# Patient Record
Sex: MALE | Race: WHITE | NOT HISPANIC OR LATINO | Employment: FULL TIME | URBAN - METROPOLITAN AREA
[De-identification: names, ages, dates, MRNs, and addresses within clinical notes are randomized per-mention and may not be internally consistent; named-entity substitution may affect disease eponyms.]

---

## 2017-06-15 ENCOUNTER — LAB VISIT (OUTPATIENT)
Dept: LAB | Facility: HOSPITAL | Age: 57
End: 2017-06-15
Attending: FAMILY MEDICINE
Payer: COMMERCIAL

## 2017-06-15 DIAGNOSIS — I10 ESSENTIAL HYPERTENSION, MALIGNANT: Primary | ICD-10-CM

## 2017-06-15 DIAGNOSIS — Z12.5 SPECIAL SCREENING FOR MALIGNANT NEOPLASM OF PROSTATE: ICD-10-CM

## 2017-06-15 LAB
ANION GAP SERPL CALC-SCNC: 7 MMOL/L
BUN SERPL-MCNC: 16 MG/DL
CALCIUM SERPL-MCNC: 9 MG/DL
CHLORIDE SERPL-SCNC: 105 MMOL/L
CHOLEST/HDLC SERPL: 4.2 {RATIO}
CO2 SERPL-SCNC: 27 MMOL/L
COMPLEXED PSA SERPL-MCNC: 2 NG/ML
CREAT SERPL-MCNC: 1 MG/DL
CREAT UR-MCNC: 130 MG/DL
EST. GFR  (AFRICAN AMERICAN): >60 ML/MIN/1.73 M^2
EST. GFR  (NON AFRICAN AMERICAN): >60 ML/MIN/1.73 M^2
GLUCOSE SERPL-MCNC: 112 MG/DL
HDL/CHOLESTEROL RATIO: 23.7 %
HDLC SERPL-MCNC: 198 MG/DL
HDLC SERPL-MCNC: 47 MG/DL
LDLC SERPL CALC-MCNC: 137.6 MG/DL
MICROALBUMIN UR DL<=1MG/L-MCNC: 10 UG/ML
MICROALBUMIN/CREATININE RATIO: 7.7 UG/MG
NONHDLC SERPL-MCNC: 151 MG/DL
POTASSIUM SERPL-SCNC: 4.4 MMOL/L
SODIUM SERPL-SCNC: 139 MMOL/L
TRIGL SERPL-MCNC: 67 MG/DL

## 2017-06-15 PROCEDURE — 80048 BASIC METABOLIC PNL TOTAL CA: CPT

## 2017-06-15 PROCEDURE — 82570 ASSAY OF URINE CREATININE: CPT

## 2017-06-15 PROCEDURE — 84153 ASSAY OF PSA TOTAL: CPT

## 2017-06-15 PROCEDURE — 36415 COLL VENOUS BLD VENIPUNCTURE: CPT | Mod: PO

## 2017-06-15 PROCEDURE — 80061 LIPID PANEL: CPT

## 2025-02-11 ENCOUNTER — OFFICE VISIT (OUTPATIENT)
Dept: SPORTS MEDICINE | Facility: CLINIC | Age: 65
End: 2025-02-11
Payer: MEDICARE

## 2025-02-11 DIAGNOSIS — M18.0 ARTHRITIS OF CARPOMETACARPAL (CMC) JOINT OF BOTH THUMBS: Primary | ICD-10-CM

## 2025-02-11 RX ORDER — LISINOPRIL 20 MG/1
20 TABLET ORAL
COMMUNITY

## 2025-02-11 RX ORDER — BETAMETHASONE SODIUM PHOSPHATE AND BETAMETHASONE ACETATE 3; 3 MG/ML; MG/ML
3 INJECTION, SUSPENSION INTRA-ARTICULAR; INTRALESIONAL; INTRAMUSCULAR; SOFT TISSUE
Status: DISCONTINUED | OUTPATIENT
Start: 2025-02-11 | End: 2025-02-11 | Stop reason: HOSPADM

## 2025-02-11 RX ORDER — TAMSULOSIN HYDROCHLORIDE 0.4 MG/1
1 CAPSULE ORAL EVERY MORNING
COMMUNITY
Start: 2025-01-28

## 2025-02-11 RX ORDER — PRAVASTATIN SODIUM 20 MG/1
1 TABLET ORAL NIGHTLY
COMMUNITY
Start: 2024-11-19 | End: 2025-11-19

## 2025-02-11 RX ADMIN — BETAMETHASONE SODIUM PHOSPHATE AND BETAMETHASONE ACETATE 3 MG: 3; 3 INJECTION, SUSPENSION INTRA-ARTICULAR; INTRALESIONAL; INTRAMUSCULAR; SOFT TISSUE at 09:02

## 2025-02-11 NOTE — PROCEDURES
Small Joint Aspiration/Injection: L thumb CMC    Date/Time: 2/11/2025 9:45 AM    Performed by: Romel Pollack MD  Authorized by: Romel Pollack MD    Consent Done?:  Yes (Verbal)  Indications:  Arthritis  Site marked: the procedure site was marked    Timeout: prior to procedure the correct patient, procedure, and site was verified    Prep: patient was prepped and draped in usual sterile fashion      Local anesthetic:  Lidocaine 1% without epinephrine  Anesthetic total (ml):  0.5    Location:  Thumb  Site:  L thumb CMC  Medications:  3 mg betamethasone acetate-betamethasone sodium phosphate 6 mg/mL  Patient tolerance:  Patient tolerated the procedure well with no immediate complications    Additional Comments: I have explained the risks, benefits, and alternatives of the procedure in detail.  The patient voices understanding and all questions have been answered.  The patient agrees to proceed as planned. After sterile prep the left  thumb cmc joint was injected while being aware of the relevant neurovascular structures with 3mg celestone and 0.5mL of 1% lidocaine with a 27g needle. The patient tolerated the injection well. The patient understands that immediate relief of pain is secondary to the local anesthetic and will be temporary.  After the anesthetic wears off there may be a increase in pain that may last for a few hours or a few days and they should use ice to help alleviate this flair up of pain.

## 2025-02-11 NOTE — PROCEDURES
Small Joint Aspiration/Injection: R thumb CMC    Date/Time: 2/11/2025 9:45 AM    Performed by: Romel Pollack MD  Authorized by: Romel Pollack MD    Consent Done?:  Yes (Verbal)  Indications:  Arthritis  Site marked: the procedure site was marked    Timeout: prior to procedure the correct patient, procedure, and site was verified    Prep: patient was prepped and draped in usual sterile fashion      Local anesthetic:  Lidocaine 1% without epinephrine  Anesthetic total (ml):  0.5    Location:  Thumb  Site:  R thumb CMC  Needle size:  27 G  Approach:  Dorsal  Medications:  3 mg betamethasone acetate-betamethasone sodium phosphate 6 mg/mL  Patient tolerance:  Patient tolerated the procedure well with no immediate complications    Additional Comments: I have explained the risks, benefits, and alternatives of the procedure in detail.  The patient voices understanding and all questions have been answered.  The patient agrees to proceed as planned. After sterile prep the right  thumb cmc joint was injected while being aware of the relevant neurovascular structures with 3mg celestone and 0.5mL of 1% lidocaine with a 27g needle. The patient tolerated the injection well. The patient understands that immediate relief of pain is secondary to the local anesthetic and will be temporary.  After the anesthetic wears off there may be a increase in pain that may last for a few hours or a few days and they should use ice to help alleviate this flair up of pain.

## 2025-02-11 NOTE — PROGRESS NOTES
KEEGAN Pollack M.D.  Orthopaedic Hand and Wrist Surgery  [facility]    Patient ID: Jimmy Suresh  YOB: 1960  MRN: [id number]    Provider Note/Medical Decision Makin. Arthritis of carpometacarpal (CMC) joint of both thumbs  Assessment & Plan:  The patient and I talked at length about the natural history and pathophysiology of bilateral thumb CMC arthritis, he understands that this is a chronic problem which may have acute episodic exacerbations.   Symptoms may resolve, worsen and even become permanent.  The patient understands the treatment options including observation, activity modification, therapy, NSAIDs, splints, injections and the surgical options including thumb CMC arthroplasty.  We discussed the risks of the diagnosis and the treatment options including pain, infection, bleeding, damage to nerves and vessels, stiffness, scarring, incomplete relief or recurrence of symptoms, poor pain and functional outcomes.  Unique risks of this diagnosis and the treatment include persistent pain and deformity.  The patients treatment is further complicated by an increased risk of infection and wound healing complications secondary to tobacco abuse.  The patient has elected to proceed with bilateral thumb CMC injections and we will follow up as needed.      Orders:  -     Small Joint Aspiration/Injection: R thumb CMC  -     Small Joint Aspiration/Injection: L thumb CMC         Chief Complaint: Pain of the Right Hand and Pain of the Left Hand      Referred By: Self,Aaareferral    History of Present Illness: Jimmy Suresh is a 64 y.o. male RHD previously established patient [facility] here for follow up on bilateral CMC thumb and wrist pain. He has previously been managed nonoperatively with corticosteroid injections that provided great relief. Last corticosteroid injection was over one year ago. Here for possible repeat corticosteroid injections to CMC joints. Denies new  injury or falls. No new symptoms. No pain at rest but will have intermittent pain with use and at night which wakes him up. Gripping and use of hands causes symptoms to aggravate. No change in health history besides spine surgery in August of 2024. Is a  and uses hands daily for work.     Patient was queried and this is the extent of the patients current complaints today.    Past Medical History:     There is no height or weight on file to calculate BMI.  History reviewed. No pertinent past medical history.  History reviewed. No pertinent surgical history.  No family history on file.  Social History     Socioeconomic History    Marital status:      Social Drivers of Health     Financial Resource Strain: Unknown (9/6/2022)    Received from [facility] and Its Subsidiaries and Affiliates    Overall Financial Resource Strain (CARDIA)     Difficulty of Paying Living Expenses: Patient declined   Food Insecurity: Unknown (9/6/2022)    Received from [facility] and Its Subsidiaries and Affiliates    Hunger Vital Sign     Worried About Running Out of Food in the Last Year: Patient declined     Ran Out of Food in the Last Year: Patient declined   Transportation Needs: Unknown (9/6/2022)    Received from [facility] and Its Subsidiaries and Affiliates    PRAPARE - Transportation     Lack of Transportation (Medical): Patient declined     Lack of Transportation (Non-Medical): Patient declined   Physical Activity: Sufficiently Active (9/6/2022)    Received from [facility] and Its Subsidiaries and Affiliates    Exercise Vital Sign     Days of Exercise per Week: 7 days     Minutes of Exercise per Session: 80 min   Stress: Unknown (9/6/2022)    Received from [facility] and Its Subsidiaries and Affiliates    [facility] of  Occupational Health - Occupational Stress Questionnaire     Feeling of Stress : Patient declined   Housing Stability: Unknown (9/6/2022)    [facility] Its Subsidiaries and Affiliates    Housing Stability Vital Sign     Unable to Pay for Housing in the Last Year: Patient refused     Unstable Housing in the Last Year: Patient refused     Medication List with Changes/Refills   Current Medications    LISINOPRIL (PRINIVIL,ZESTRIL) 20 MG TABLET    Take 20 mg by mouth.    PRAVASTATIN (PRAVACHOL) 20 MG TABLET    Take 1 tablet by mouth every evening.    TAMSULOSIN (FLOMAX) 0.4 MG CAP    Take 1 capsule by mouth every morning.     Review of patient's allergies indicates:  No Known Allergies  ROS    Physical Exam:   GENERAL: Well appearing, appropriate for stated age, no acute distress.  CARDIOVASCULAR:  Fingers have good brisk refill and good turgor.   PULMONARY: Respirations are even and non-labored.  NEURO: Awake, alert, and oriented x 3.  PSYCH: Mood & affect are appropriate.              Right Hand/Wrist Exam     Range of Motion   The patient has normal right hand/wrist ROM.      Left Hand/Wrist Exam     Range of Motion   The patient has normal left hand/wrist ROM.        Hand/Wrist Musculoskeletal Exam    Palpation    Right      Thumb tenderness to palpation: carpometacarpal joint      Dorsal hand - 1st metacarpal tenderness to palpation: CMC    Left      Thumb tenderness to palpation: carpometacarpal joint      Dorsal hand - 1st metacarpal tenderness to palpation: CMC    Range of Motion    Right Hand      Right hand range of motion is normal.      Left Hand      Left hand range of motion is normal.       Right Wrist      Right wrist range of motion is normal.      Left Wrist      Left wrist range of motion is normal.      Special Tests    Right      CMC grind test: positive    Left      CMC grind test: positive     Positive shoulder sign bilaterally   Negative  Finkelstein's  No tenderness 1st dorsal compartment    Other Tests:     None    Provider Note/Medical Decision Makin. Arthritis of carpometacarpal (CMC) joint of both thumbs  Assessment & Plan:  The patient and I talked at length about the natural history and pathophysiology of bilateral thumb CMC arthritis, he understands that this is a chronic problem which may have acute episodic exacerbations.   Symptoms may resolve, worsen and even become permanent.  The patient understands the treatment options including observation, activity modification, therapy, NSAIDs, splints, injections and the surgical options including thumb CMC arthroplasty.  We discussed the risks of the diagnosis and the treatment options including pain, infection, bleeding, damage to nerves and vessels, stiffness, scarring, incomplete relief or recurrence of symptoms, poor pain and functional outcomes.  Unique risks of this diagnosis and the treatment include persistent pain and deformity.  The patients treatment is further complicated by an increased risk of infection and wound healing complications secondary to tobacco abuse.  The patient has elected to proceed with bilateral thumb CMC injections and we will follow up as needed.      Orders:  -     Small Joint Aspiration/Injection: R thumb CMC  -     Small Joint Aspiration/Injection: L thumb CMC         I discussed worrisome and red flag signs and symptoms with the patient. The patient expressed understanding and agreed to alert me immediately or to go to the emergency room if they experience any of these.   Treatment plan was developed with input from the patient/family, and they expressed understanding and agreement with the plan. All questions were answered today.    There are no Patient Instructions on file for this visit.    KEEGAN Pollack M.D.  [facility]  Orthopedic Hand and Wrist Surgeon    [facility]  Dr. Jd Pollack   [employer] Review    [employer]   [employer]     Disclaimer: This note was prepared using a voice recognition system and is likely to have sound alike errors within the text.

## 2025-02-11 NOTE — ASSESSMENT & PLAN NOTE
The patient and I talked at length about the natural history and pathophysiology of bilateral thumb CMC arthritis, he understands that this is a chronic problem which may have acute episodic exacerbations.   Symptoms may resolve, worsen and even become permanent.  The patient understands the treatment options including observation, activity modification, therapy, NSAIDs, splints, injections and the surgical options including thumb CMC arthroplasty.  We discussed the risks of the diagnosis and the treatment options including pain, infection, bleeding, damage to nerves and vessels, stiffness, scarring, incomplete relief or recurrence of symptoms, poor pain and functional outcomes.  Unique risks of this diagnosis and the treatment include persistent pain and deformity.  The patients treatment is further complicated by an increased risk of infection and wound healing complications secondary to tobacco abuse.  The patient has elected to proceed with bilateral thumb CMC injections and we will follow up as needed.